# Patient Record
Sex: FEMALE | Race: OTHER | HISPANIC OR LATINO | URBAN - METROPOLITAN AREA
[De-identification: names, ages, dates, MRNs, and addresses within clinical notes are randomized per-mention and may not be internally consistent; named-entity substitution may affect disease eponyms.]

---

## 2018-09-05 ENCOUNTER — EMERGENCY (EMERGENCY)
Facility: HOSPITAL | Age: 38
LOS: 1 days | Discharge: HOME | End: 2018-09-05
Attending: EMERGENCY MEDICINE | Admitting: EMERGENCY MEDICINE
Payer: MEDICAID

## 2018-09-05 VITALS
SYSTOLIC BLOOD PRESSURE: 137 MMHG | DIASTOLIC BLOOD PRESSURE: 83 MMHG | OXYGEN SATURATION: 100 % | RESPIRATION RATE: 16 BRPM | HEART RATE: 81 BPM | TEMPERATURE: 98 F

## 2018-09-05 DIAGNOSIS — Z98.51 TUBAL LIGATION STATUS: Chronic | ICD-10-CM

## 2018-09-05 LAB
ALBUMIN SERPL ELPH-MCNC: 4.1 G/DL — SIGNIFICANT CHANGE UP (ref 3.3–5)
ALP SERPL-CCNC: 93 U/L — SIGNIFICANT CHANGE UP (ref 40–120)
ALT FLD-CCNC: 26 U/L — SIGNIFICANT CHANGE UP (ref 4–33)
APPEARANCE UR: SIGNIFICANT CHANGE UP
AST SERPL-CCNC: 22 U/L — SIGNIFICANT CHANGE UP (ref 4–32)
BASOPHILS # BLD AUTO: 0.02 K/UL — SIGNIFICANT CHANGE UP (ref 0–0.2)
BASOPHILS NFR BLD AUTO: 0.3 % — SIGNIFICANT CHANGE UP (ref 0–2)
BILIRUB SERPL-MCNC: 0.2 MG/DL — SIGNIFICANT CHANGE UP (ref 0.2–1.2)
BILIRUB UR-MCNC: SIGNIFICANT CHANGE UP
BLOOD UR QL VISUAL: SIGNIFICANT CHANGE UP
BUN SERPL-MCNC: 10 MG/DL — SIGNIFICANT CHANGE UP (ref 7–23)
CALCIUM SERPL-MCNC: 9.2 MG/DL — SIGNIFICANT CHANGE UP (ref 8.4–10.5)
CHLORIDE SERPL-SCNC: 101 MMOL/L — SIGNIFICANT CHANGE UP (ref 98–107)
CO2 SERPL-SCNC: 25 MMOL/L — SIGNIFICANT CHANGE UP (ref 22–31)
COLOR SPEC: SIGNIFICANT CHANGE UP
CREAT SERPL-MCNC: 0.6 MG/DL — SIGNIFICANT CHANGE UP (ref 0.5–1.3)
EOSINOPHIL # BLD AUTO: 0.12 K/UL — SIGNIFICANT CHANGE UP (ref 0–0.5)
EOSINOPHIL NFR BLD AUTO: 1.6 % — SIGNIFICANT CHANGE UP (ref 0–6)
GLUCOSE SERPL-MCNC: 106 MG/DL — HIGH (ref 70–99)
GLUCOSE UR-MCNC: 100 — SIGNIFICANT CHANGE UP
HCG SERPL-ACNC: < 5 MIU/ML — SIGNIFICANT CHANGE UP
HCT VFR BLD CALC: 37 % — SIGNIFICANT CHANGE UP (ref 34.5–45)
HGB BLD-MCNC: 12.2 G/DL — SIGNIFICANT CHANGE UP (ref 11.5–15.5)
IMM GRANULOCYTES # BLD AUTO: 0.05 # — SIGNIFICANT CHANGE UP
IMM GRANULOCYTES NFR BLD AUTO: 0.7 % — SIGNIFICANT CHANGE UP (ref 0–1.5)
KETONES UR-MCNC: 15 — SIGNIFICANT CHANGE UP
LEUKOCYTE ESTERASE UR-ACNC: HIGH
LYMPHOCYTES # BLD AUTO: 2.08 K/UL — SIGNIFICANT CHANGE UP (ref 1–3.3)
LYMPHOCYTES # BLD AUTO: 27.1 % — SIGNIFICANT CHANGE UP (ref 13–44)
MCHC RBC-ENTMCNC: 29.7 PG — SIGNIFICANT CHANGE UP (ref 27–34)
MCHC RBC-ENTMCNC: 33 % — SIGNIFICANT CHANGE UP (ref 32–36)
MCV RBC AUTO: 90 FL — SIGNIFICANT CHANGE UP (ref 80–100)
MONOCYTES # BLD AUTO: 0.57 K/UL — SIGNIFICANT CHANGE UP (ref 0–0.9)
MONOCYTES NFR BLD AUTO: 7.4 % — SIGNIFICANT CHANGE UP (ref 2–14)
NEUTROPHILS # BLD AUTO: 4.84 K/UL — SIGNIFICANT CHANGE UP (ref 1.8–7.4)
NEUTROPHILS NFR BLD AUTO: 62.9 % — SIGNIFICANT CHANGE UP (ref 43–77)
NITRITE UR-MCNC: POSITIVE — SIGNIFICANT CHANGE UP
NRBC # FLD: 0 — SIGNIFICANT CHANGE UP
PH UR: 5 — SIGNIFICANT CHANGE UP (ref 5–8)
PLATELET # BLD AUTO: 202 K/UL — SIGNIFICANT CHANGE UP (ref 150–400)
PMV BLD: 12.3 FL — SIGNIFICANT CHANGE UP (ref 7–13)
POTASSIUM SERPL-MCNC: 4 MMOL/L — SIGNIFICANT CHANGE UP (ref 3.5–5.3)
POTASSIUM SERPL-SCNC: 4 MMOL/L — SIGNIFICANT CHANGE UP (ref 3.5–5.3)
PROT SERPL-MCNC: 7.5 G/DL — SIGNIFICANT CHANGE UP (ref 6–8.3)
PROT UR-MCNC: >300 — SIGNIFICANT CHANGE UP
RBC # BLD: 4.11 M/UL — SIGNIFICANT CHANGE UP (ref 3.8–5.2)
RBC # FLD: 13.4 % — SIGNIFICANT CHANGE UP (ref 10.3–14.5)
SODIUM SERPL-SCNC: 138 MMOL/L — SIGNIFICANT CHANGE UP (ref 135–145)
SP GR SPEC: 1.01 — SIGNIFICANT CHANGE UP (ref 1–1.04)
UROBILINOGEN FLD QL: 0.2 — SIGNIFICANT CHANGE UP
WBC # BLD: 7.68 K/UL — SIGNIFICANT CHANGE UP (ref 3.8–10.5)
WBC # FLD AUTO: 7.68 K/UL — SIGNIFICANT CHANGE UP (ref 3.8–10.5)

## 2018-09-05 PROCEDURE — 99283 EMERGENCY DEPT VISIT LOW MDM: CPT

## 2018-09-05 RX ORDER — IBUPROFEN 200 MG
600 TABLET ORAL ONCE
Qty: 0 | Refills: 0 | Status: COMPLETED | OUTPATIENT
Start: 2018-09-05 | End: 2018-09-05

## 2018-09-05 RX ORDER — AZTREONAM 2 G
1 VIAL (EA) INJECTION
Qty: 6 | Refills: 0 | OUTPATIENT
Start: 2018-09-05 | End: 2018-09-07

## 2018-09-05 RX ORDER — IBUPROFEN 200 MG
400 TABLET ORAL ONCE
Qty: 0 | Refills: 0 | Status: DISCONTINUED | OUTPATIENT
Start: 2018-09-05 | End: 2018-09-05

## 2018-09-05 RX ADMIN — Medication 600 MILLIGRAM(S): at 14:33

## 2018-09-05 NOTE — ED PROVIDER NOTE - ATTENDING CONTRIBUTION TO CARE
DR. OBRIEN, ATTENDING MD-  I performed a face to face bedside interview with patient regarding history of present illness, review of symptoms and past medical history. I completed an independent physical exam.  I have discussed patient's plan of care with the resident.   Documentation as above in the note.    Robbin: h/o fibroids, s/p "surgery" for the condition this past spring.  Her fibroid related pains returned a few months ago and occur with each menstruation.  Her current sx are consistent with past pain.  On exam, NAD, comfortable.  Lungs clear, heart rrr, abd soft nt.  A/P fibroid related pain, neg UCG.  No urinary sx.  benign exam.  Ibuprofen given, will f/u GYN

## 2018-09-05 NOTE — ED PROVIDER NOTE - MEDICAL DECISION MAKING DETAILS
39 yo woman PMH fibroids presents with bilateral lower abdominal pain x 4 days with recent onset of abdominal pain. Likely menstrual cramps worsened by fibroids but with flank pain, possible kidney stones or pyelonephritis although less likely to be bilateral. Will check CBC, CMP, UA. Will pain control with ibuprofen. Will do pelvic exam and may need ultrasound. Will continue to monitor and reassess. 39 yo woman PMH fibroids presents with bilateral lower abdominal pain x 4 days with recent onset of abdominal pain. Likely menstrual cramps worsened by fibroids but with flank pain, possible kidney stones or pyelonephritis although less likely to be bilateral. Will check CBC, CMP, UA. Will pain control. Will continue to monitor and reassess. 39 yo woman PMH fibroids presents with bilateral lower abdominal pain x 4 days with recent onset of abdominal pain. Likely menstrual cramps worsened by fibroids but with flank pain, possible kidney stones or pyelonephritis although less likely to be bilateral. Will check CBC, CMP, UA, hcg. Will pain control. Will continue to monitor and reassess.

## 2018-09-05 NOTE — ED PROVIDER NOTE - NS ED ROS FT
General: no fevers or chills  Head: no headaches  Eyes: no vision change  ENT: no ear pain, no neck pain, no dysphagia  CV: no chest pain, no palpitations  Resp: no SOB, no cough  GI: no N/V/D, no blood in stool  : no dysuria, no hematuria  Skin: no rashes General: no fevers or chills  Head: no headaches  Eyes: no vision change  ENT: no ear pain, no neck pain, no dysphagia  CV: no chest pain, no palpitations  Resp: no SOB, no cough  GI: no N/V/D, no blood in stool  : no dysuria, no hematuria, no vaginal discharge  Skin: no rashes

## 2018-09-05 NOTE — ED PROVIDER NOTE - PROGRESS NOTE DETAILS
Marbella Park, resident MD: pt has improvement of pain with ibuprofen. pt denies any dysuria. most likely has menstrual cramping and will discharge with ob/gyn f/u and pain control with ibuprofen. discussed with patient. Marbella Park, resident MD: pt has improvement of pain with ibuprofen. most likely has menstrual cramping and will discharge with ob/gyn f/u and pain control with ibuprofen. Marbella Park, resident MD: pt reports new onset dysuria after using bathroom. UA +nitrites and leuk. Will send abx to treat for UTI. pt understands and will f/u with gyn outpt for menstrual pain. pt is safe for discharge and return precautions were discussed.

## 2018-09-05 NOTE — ED PROVIDER NOTE - PHYSICAL EXAMINATION
General: well-appearing woman in no acute distress  Head: normocephalic, atraumatic  Eyes: PERRL, pink conjunctiva  Mouth: moist mucous membranes  Neck: no lymphadenopathy  CV: normal rate and rhythm, normal S1 and S2  Respiratory: clear to auscultation bilaterally  Abdomen: tender to palpation of LLQ, RLQ, suprapubic region, soft, nondistended  Back: lower back/bilateral flank tenderness to palpation, no midline tenderness to palpation  Neuro: alert and oriented x3  Skin: no rashes or lesions on back or abdomen  Extremities: no edema, peripheral pulses 2+ bilaterally   Pelvic: General: well-appearing woman in no acute distress  Head: normocephalic, atraumatic  Eyes: PERRL, pink conjunctiva  Mouth: moist mucous membranes  Neck: no lymphadenopathy  CV: normal rate and rhythm, normal S1 and S2  Respiratory: clear to auscultation bilaterally  Abdomen: tender to palpation of LLQ, RLQ, suprapubic region, soft, nondistended  Back: lower back/bilateral flank tenderness to palpation, no midline tenderness to palpation  Neuro: alert and oriented x3  Skin: no rashes or lesions on back or abdomen  Extremities: no edema, peripheral pulses 2+ bilaterally

## 2018-09-05 NOTE — ED PROVIDER NOTE - OBJECTIVE STATEMENT
37 yo woman PMH fibroids s/p ?embolization 2/2018 and tubal ligation in 2005 presents with abdominal pain x 4 days. She complains of intermittent, cramping bilateral flank pain that radiates down the groin and to her suprapubic region. She denies dysuria and did not notice blood in the urine prior to her menses. Pt had relief of menstrual pain after embolization for 3 months but in the past 3 months she has had worsening abdominal pain with menses and has been passing small clots. Current pain is similar to pain she had with previous 2 menses and lasts for about 8-9 days. She tried to call the surgeon for follow up but they were closed and decided to get seen in ED because of her continued pain. Her menstrual cycle started yesterday and she took motrin which minimally helped. She denies N/V/D, denies fevers or chills. 37 yo woman PMH fibroids s/p ?embolization 2/2018 and tubal ligation in 2005 presents with abdominal pain x 4 days. She complains of intermittent, cramping bilateral flank pain that radiates down the groin and to her suprapubic region. She denies dysuria and did not notice blood in the urine prior to her menses. Pt had relief of menstrual pain after embolization for 3 months but in the past 3 months she has had worsening abdominal pain with menses and has been passing small clots. Current pain is similar to pain she had with previous 2 menses and lasts for about 8-9 days. She tried to call the surgeon for follow up but they were closed and decided to get seen in ED because of her continued pain. Her menstrual cycle started yesterday and she took motrin which minimally helped. She denies any vaginal discharge. She denies N/V/D, denies fevers or chills.

## 2018-09-05 NOTE — ED ADULT TRIAGE NOTE - CHIEF COMPLAINT QUOTE
Patient c/o lower abdominal pain x 1 week , states she got her period yesterday and has heavy vaginal bleeding. H/o fibroids.

## 2019-08-14 ENCOUNTER — EMERGENCY (EMERGENCY)
Facility: HOSPITAL | Age: 39
LOS: 1 days | Discharge: HOME | End: 2019-08-14
Admitting: EMERGENCY MEDICINE
Payer: MEDICAID

## 2019-08-14 VITALS
RESPIRATION RATE: 15 BRPM | SYSTOLIC BLOOD PRESSURE: 127 MMHG | DIASTOLIC BLOOD PRESSURE: 83 MMHG | HEART RATE: 80 BPM | OXYGEN SATURATION: 100 %

## 2019-08-14 VITALS
DIASTOLIC BLOOD PRESSURE: 89 MMHG | OXYGEN SATURATION: 100 % | RESPIRATION RATE: 16 BRPM | HEART RATE: 83 BPM | TEMPERATURE: 98 F | SYSTOLIC BLOOD PRESSURE: 149 MMHG

## 2019-08-14 DIAGNOSIS — Z98.51 TUBAL LIGATION STATUS: Chronic | ICD-10-CM

## 2019-08-14 DIAGNOSIS — Z90.49 ACQUIRED ABSENCE OF OTHER SPECIFIED PARTS OF DIGESTIVE TRACT: Chronic | ICD-10-CM

## 2019-08-14 DIAGNOSIS — Z90.710 ACQUIRED ABSENCE OF BOTH CERVIX AND UTERUS: Chronic | ICD-10-CM

## 2019-08-14 PROBLEM — D25.9 LEIOMYOMA OF UTERUS, UNSPECIFIED: Chronic | Status: ACTIVE | Noted: 2018-09-05

## 2019-08-14 LAB
ALBUMIN SERPL ELPH-MCNC: 4.5 G/DL — SIGNIFICANT CHANGE UP (ref 3.3–5)
ALP SERPL-CCNC: 95 U/L — SIGNIFICANT CHANGE UP (ref 40–120)
ALT FLD-CCNC: 28 U/L — SIGNIFICANT CHANGE UP (ref 4–33)
ANION GAP SERPL CALC-SCNC: 13 MMO/L — SIGNIFICANT CHANGE UP (ref 7–14)
APPEARANCE UR: CLEAR — SIGNIFICANT CHANGE UP
AST SERPL-CCNC: 18 U/L — SIGNIFICANT CHANGE UP (ref 4–32)
BACTERIA # UR AUTO: NEGATIVE — SIGNIFICANT CHANGE UP
BILIRUB SERPL-MCNC: 0.5 MG/DL — SIGNIFICANT CHANGE UP (ref 0.2–1.2)
BILIRUB UR-MCNC: NEGATIVE — SIGNIFICANT CHANGE UP
BLOOD UR QL VISUAL: HIGH
BUN SERPL-MCNC: 7 MG/DL — SIGNIFICANT CHANGE UP (ref 7–23)
CALCIUM SERPL-MCNC: 9.4 MG/DL — SIGNIFICANT CHANGE UP (ref 8.4–10.5)
CHLORIDE SERPL-SCNC: 103 MMOL/L — SIGNIFICANT CHANGE UP (ref 98–107)
CO2 SERPL-SCNC: 24 MMOL/L — SIGNIFICANT CHANGE UP (ref 22–31)
COLOR SPEC: COLORLESS — SIGNIFICANT CHANGE UP
CREAT SERPL-MCNC: 0.58 MG/DL — SIGNIFICANT CHANGE UP (ref 0.5–1.3)
GLUCOSE SERPL-MCNC: 112 MG/DL — HIGH (ref 70–99)
GLUCOSE UR-MCNC: NEGATIVE — SIGNIFICANT CHANGE UP
HCT VFR BLD CALC: 38.7 % — SIGNIFICANT CHANGE UP (ref 34.5–45)
HGB BLD-MCNC: 13 G/DL — SIGNIFICANT CHANGE UP (ref 11.5–15.5)
HYALINE CASTS # UR AUTO: SIGNIFICANT CHANGE UP
KETONES UR-MCNC: NEGATIVE — SIGNIFICANT CHANGE UP
LEUKOCYTE ESTERASE UR-ACNC: SIGNIFICANT CHANGE UP
MCHC RBC-ENTMCNC: 30.8 PG — SIGNIFICANT CHANGE UP (ref 27–34)
MCHC RBC-ENTMCNC: 33.6 % — SIGNIFICANT CHANGE UP (ref 32–36)
MCV RBC AUTO: 91.7 FL — SIGNIFICANT CHANGE UP (ref 80–100)
NITRITE UR-MCNC: NEGATIVE — SIGNIFICANT CHANGE UP
NRBC # FLD: 0 K/UL — SIGNIFICANT CHANGE UP (ref 0–0)
PH UR: 7 — SIGNIFICANT CHANGE UP (ref 5–8)
PLATELET # BLD AUTO: 216 K/UL — SIGNIFICANT CHANGE UP (ref 150–400)
PMV BLD: 12.1 FL — SIGNIFICANT CHANGE UP (ref 7–13)
POTASSIUM SERPL-MCNC: 3.8 MMOL/L — SIGNIFICANT CHANGE UP (ref 3.5–5.3)
POTASSIUM SERPL-SCNC: 3.8 MMOL/L — SIGNIFICANT CHANGE UP (ref 3.5–5.3)
PROT SERPL-MCNC: 7.7 G/DL — SIGNIFICANT CHANGE UP (ref 6–8.3)
PROT UR-MCNC: NEGATIVE — SIGNIFICANT CHANGE UP
RBC # BLD: 4.22 M/UL — SIGNIFICANT CHANGE UP (ref 3.8–5.2)
RBC # FLD: 12.6 % — SIGNIFICANT CHANGE UP (ref 10.3–14.5)
RBC CASTS # UR COMP ASSIST: SIGNIFICANT CHANGE UP (ref 0–?)
SODIUM SERPL-SCNC: 140 MMOL/L — SIGNIFICANT CHANGE UP (ref 135–145)
SP GR SPEC: 1 — LOW (ref 1–1.04)
SQUAMOUS # UR AUTO: SIGNIFICANT CHANGE UP
UROBILINOGEN FLD QL: NORMAL — SIGNIFICANT CHANGE UP
WBC # BLD: 12.74 K/UL — HIGH (ref 3.8–10.5)
WBC # FLD AUTO: 12.74 K/UL — HIGH (ref 3.8–10.5)
WBC UR QL: HIGH (ref 0–?)

## 2019-08-14 PROCEDURE — 99284 EMERGENCY DEPT VISIT MOD MDM: CPT

## 2019-08-14 RX ORDER — CEFUROXIME AXETIL 250 MG
1 TABLET ORAL
Qty: 20 | Refills: 0
Start: 2019-08-14 | End: 2019-08-23

## 2019-08-14 RX ORDER — SODIUM CHLORIDE 9 MG/ML
1000 INJECTION INTRAMUSCULAR; INTRAVENOUS; SUBCUTANEOUS ONCE
Refills: 0 | Status: COMPLETED | OUTPATIENT
Start: 2019-08-14 | End: 2019-08-14

## 2019-08-14 RX ORDER — PHENAZOPYRIDINE HCL 100 MG
1 TABLET ORAL
Qty: 6 | Refills: 0
Start: 2019-08-14 | End: 2019-08-15

## 2019-08-14 RX ORDER — ACETAMINOPHEN 500 MG
650 TABLET ORAL ONCE
Refills: 0 | Status: COMPLETED | OUTPATIENT
Start: 2019-08-14 | End: 2019-08-14

## 2019-08-14 RX ORDER — KETOROLAC TROMETHAMINE 30 MG/ML
15 SYRINGE (ML) INJECTION ONCE
Refills: 0 | Status: DISCONTINUED | OUTPATIENT
Start: 2019-08-14 | End: 2019-08-14

## 2019-08-14 RX ADMIN — Medication 15 MILLIGRAM(S): at 09:25

## 2019-08-14 RX ADMIN — SODIUM CHLORIDE 2000 MILLILITER(S): 9 INJECTION INTRAMUSCULAR; INTRAVENOUS; SUBCUTANEOUS at 08:47

## 2019-08-14 RX ADMIN — Medication 650 MILLIGRAM(S): at 11:02

## 2019-08-14 RX ADMIN — Medication 15 MILLIGRAM(S): at 08:47

## 2019-08-14 NOTE — ED ADULT NURSE REASSESSMENT NOTE - NS ED NURSE REASSESS COMMENT FT1
Received pt and family to RW  with IVF running via [id number]. Pt c/o back pain and pain upon urination with blood. Awaiting results of labs sent. Explanation re above given to pt.

## 2019-08-14 NOTE — ED ADULT NURSE NOTE - OBJECTIVE STATEMENT
Pt presenting to intake AxOx4 ambulatory at baseline with c/o lower back pain, burning with urination, blood in urine x2 days. Pt denies any significant PMH. On arrival to ED pt's breathing is even and unlabored. Abdomen nondistended. Pt denies CP, SOB, N/V. IV established with 20g in R[facility], labs drawn and sent, MD at bedside, will continue to monitor.

## 2019-08-14 NOTE — ED PROVIDER NOTE - OBJECTIVE STATEMENT
39 y/o F c/o dysuria, urinary frequency, urgency, hematuria, suprapubic pressure, lower back pain since 2 am this morning. Denies fever/chills, nausea, vomiting, flank pain. Pt denies hx of UTI or pyelo.

## 2019-08-14 NOTE — ED PROVIDER NOTE - NSFOLLOWUPINSTRUCTIONS_ED_ALL_ED_FT
Ya mucha agua y liquidos zamzam.  Puede abe ibuprofen (Advil) 3 pastillas (600mg) cada 6 horas con comida.  Sigue con licea doctor primario entre 2-3 lanier.  Regresa al hospital si tiene mas dolor abdominal o dolor de la espalda, vomito, fiebre o si licea condicion o simptomas se ponen peor.

## 2019-08-14 NOTE — ED ADULT TRIAGE NOTE - CHIEF COMPLAINT QUOTE
Patient c/o lower pelvic pain, lower back pain and pain on urination. Patient reports blood in urine starting yesterday. Denies any fevers at home. Hx. hysterectomy.

## 2019-08-14 NOTE — ED PROVIDER NOTE - CLINICAL SUMMARY MEDICAL DECISION MAKING FREE TEXT BOX
Pt with UTI, mildly elevated WBC. She is afebrile, VSS, no CVA tenderness bilat, tolerating PO. Stable for d/c.

## 2019-08-15 LAB — SPECIMEN SOURCE: SIGNIFICANT CHANGE UP

## 2019-08-16 LAB
-  AMIKACIN: SIGNIFICANT CHANGE UP
-  AMPICILLIN/SULBACTAM: SIGNIFICANT CHANGE UP
-  AMPICILLIN: SIGNIFICANT CHANGE UP
-  AZTREONAM: SIGNIFICANT CHANGE UP
-  CEFAZOLIN: SIGNIFICANT CHANGE UP
-  CEFEPIME: SIGNIFICANT CHANGE UP
-  CEFOXITIN: SIGNIFICANT CHANGE UP
-  CEFTAZIDIME: SIGNIFICANT CHANGE UP
-  CEFTRIAXONE: SIGNIFICANT CHANGE UP
-  CIPROFLOXACIN: SIGNIFICANT CHANGE UP
-  ERTAPENEM: SIGNIFICANT CHANGE UP
-  GENTAMICIN: SIGNIFICANT CHANGE UP
-  IMIPENEM: SIGNIFICANT CHANGE UP
-  LEVOFLOXACIN: SIGNIFICANT CHANGE UP
-  MEROPENEM: SIGNIFICANT CHANGE UP
-  NITROFURANTOIN: SIGNIFICANT CHANGE UP
-  PIPERACILLIN/TAZOBACTAM: SIGNIFICANT CHANGE UP
-  TIGECYCLINE: SIGNIFICANT CHANGE UP
-  TOBRAMYCIN: SIGNIFICANT CHANGE UP
-  TRIMETHOPRIM/SULFAMETHOXAZOLE: SIGNIFICANT CHANGE UP
BACTERIA UR CULT: SIGNIFICANT CHANGE UP
METHOD TYPE: SIGNIFICANT CHANGE UP
ORGANISM # SPEC MICROSCOPIC CNT: SIGNIFICANT CHANGE UP
ORGANISM # SPEC MICROSCOPIC CNT: SIGNIFICANT CHANGE UP

## 2019-08-19 ENCOUNTER — EMERGENCY (EMERGENCY)
Facility: HOSPITAL | Age: 39
LOS: 1 days | Discharge: HOME | End: 2019-08-19
Attending: EMERGENCY MEDICINE | Admitting: EMERGENCY MEDICINE
Payer: MEDICAID

## 2019-08-19 VITALS
DIASTOLIC BLOOD PRESSURE: 87 MMHG | SYSTOLIC BLOOD PRESSURE: 144 MMHG | HEART RATE: 75 BPM | OXYGEN SATURATION: 100 % | TEMPERATURE: 98 F | RESPIRATION RATE: 18 BRPM

## 2019-08-19 DIAGNOSIS — Z90.49 ACQUIRED ABSENCE OF OTHER SPECIFIED PARTS OF DIGESTIVE TRACT: Chronic | ICD-10-CM

## 2019-08-19 DIAGNOSIS — Z98.51 TUBAL LIGATION STATUS: Chronic | ICD-10-CM

## 2019-08-19 DIAGNOSIS — Z90.710 ACQUIRED ABSENCE OF BOTH CERVIX AND UTERUS: Chronic | ICD-10-CM

## 2019-08-19 LAB
ANION GAP SERPL CALC-SCNC: 13 MMO/L — SIGNIFICANT CHANGE UP (ref 7–14)
APPEARANCE UR: SIGNIFICANT CHANGE UP
BACTERIA # UR AUTO: SIGNIFICANT CHANGE UP
BASOPHILS # BLD AUTO: 0.04 K/UL — SIGNIFICANT CHANGE UP (ref 0–0.2)
BASOPHILS NFR BLD AUTO: 0.4 % — SIGNIFICANT CHANGE UP (ref 0–2)
BILIRUB UR-MCNC: NEGATIVE — SIGNIFICANT CHANGE UP
BLOOD UR QL VISUAL: SIGNIFICANT CHANGE UP
BUN SERPL-MCNC: 11 MG/DL — SIGNIFICANT CHANGE UP (ref 7–23)
CALCIUM SERPL-MCNC: 9.3 MG/DL — SIGNIFICANT CHANGE UP (ref 8.4–10.5)
CHLORIDE SERPL-SCNC: 103 MMOL/L — SIGNIFICANT CHANGE UP (ref 98–107)
CO2 SERPL-SCNC: 22 MMOL/L — SIGNIFICANT CHANGE UP (ref 22–31)
COLOR SPEC: COLORLESS — SIGNIFICANT CHANGE UP
CREAT SERPL-MCNC: 0.49 MG/DL — LOW (ref 0.5–1.3)
EOSINOPHIL # BLD AUTO: 0.15 K/UL — SIGNIFICANT CHANGE UP (ref 0–0.5)
EOSINOPHIL NFR BLD AUTO: 1.3 % — SIGNIFICANT CHANGE UP (ref 0–6)
EPI CELLS # UR: SIGNIFICANT CHANGE UP
GLUCOSE SERPL-MCNC: 99 MG/DL — SIGNIFICANT CHANGE UP (ref 70–99)
GLUCOSE UR-MCNC: NEGATIVE — SIGNIFICANT CHANGE UP
HCT VFR BLD CALC: 38.3 % — SIGNIFICANT CHANGE UP (ref 34.5–45)
HGB BLD-MCNC: 12.9 G/DL — SIGNIFICANT CHANGE UP (ref 11.5–15.5)
IMM GRANULOCYTES NFR BLD AUTO: 0.4 % — SIGNIFICANT CHANGE UP (ref 0–1.5)
KETONES UR-MCNC: NEGATIVE — SIGNIFICANT CHANGE UP
LEUKOCYTE ESTERASE UR-ACNC: SIGNIFICANT CHANGE UP
LYMPHOCYTES # BLD AUTO: 2.77 K/UL — SIGNIFICANT CHANGE UP (ref 1–3.3)
LYMPHOCYTES # BLD AUTO: 24.3 % — SIGNIFICANT CHANGE UP (ref 13–44)
MCHC RBC-ENTMCNC: 30.7 PG — SIGNIFICANT CHANGE UP (ref 27–34)
MCHC RBC-ENTMCNC: 33.7 % — SIGNIFICANT CHANGE UP (ref 32–36)
MCV RBC AUTO: 91.2 FL — SIGNIFICANT CHANGE UP (ref 80–100)
MONOCYTES # BLD AUTO: 0.52 K/UL — SIGNIFICANT CHANGE UP (ref 0–0.9)
MONOCYTES NFR BLD AUTO: 4.6 % — SIGNIFICANT CHANGE UP (ref 2–14)
NEUTROPHILS # BLD AUTO: 7.85 K/UL — HIGH (ref 1.8–7.4)
NEUTROPHILS NFR BLD AUTO: 69 % — SIGNIFICANT CHANGE UP (ref 43–77)
NITRITE UR-MCNC: NEGATIVE — SIGNIFICANT CHANGE UP
NRBC # FLD: 0 K/UL — SIGNIFICANT CHANGE UP (ref 0–0)
PH UR: 6.5 — SIGNIFICANT CHANGE UP (ref 5–8)
PLATELET # BLD AUTO: 199 K/UL — SIGNIFICANT CHANGE UP (ref 150–400)
PMV BLD: 12.2 FL — SIGNIFICANT CHANGE UP (ref 7–13)
POTASSIUM SERPL-MCNC: 3.8 MMOL/L — SIGNIFICANT CHANGE UP (ref 3.5–5.3)
POTASSIUM SERPL-SCNC: 3.8 MMOL/L — SIGNIFICANT CHANGE UP (ref 3.5–5.3)
PROT UR-MCNC: NEGATIVE — SIGNIFICANT CHANGE UP
RBC # BLD: 4.2 M/UL — SIGNIFICANT CHANGE UP (ref 3.8–5.2)
RBC # FLD: 12.8 % — SIGNIFICANT CHANGE UP (ref 10.3–14.5)
RBC CASTS # UR COMP ASSIST: SIGNIFICANT CHANGE UP (ref 0–?)
SODIUM SERPL-SCNC: 138 MMOL/L — SIGNIFICANT CHANGE UP (ref 135–145)
SP GR SPEC: 1.01 — SIGNIFICANT CHANGE UP (ref 1–1.04)
UROBILINOGEN FLD QL: NORMAL — SIGNIFICANT CHANGE UP
WBC # BLD: 11.38 K/UL — HIGH (ref 3.8–10.5)
WBC # FLD AUTO: 11.38 K/UL — HIGH (ref 3.8–10.5)
WBC UR QL: SIGNIFICANT CHANGE UP (ref 0–?)

## 2019-08-19 PROCEDURE — 99284 EMERGENCY DEPT VISIT MOD MDM: CPT

## 2019-08-19 PROCEDURE — 76830 TRANSVAGINAL US NON-OB: CPT | Mod: 26

## 2019-08-19 RX ORDER — METRONIDAZOLE 500 MG
500 TABLET ORAL ONCE
Refills: 0 | Status: COMPLETED | OUTPATIENT
Start: 2019-08-19 | End: 2019-08-19

## 2019-08-19 RX ORDER — KETOROLAC TROMETHAMINE 30 MG/ML
15 SYRINGE (ML) INJECTION ONCE
Refills: 0 | Status: DISCONTINUED | OUTPATIENT
Start: 2019-08-19 | End: 2019-08-19

## 2019-08-19 RX ORDER — METRONIDAZOLE 500 MG
1 TABLET ORAL
Qty: 21 | Refills: 0
Start: 2019-08-19 | End: 2019-08-25

## 2019-08-19 RX ORDER — FLUCONAZOLE 150 MG/1
1 TABLET ORAL
Qty: 1 | Refills: 0
Start: 2019-08-19

## 2019-08-19 RX ORDER — CIPROFLOXACIN LACTATE 400MG/40ML
1 VIAL (ML) INTRAVENOUS
Qty: 6 | Refills: 0
Start: 2019-08-19 | End: 2019-08-21

## 2019-08-19 RX ORDER — CIPROFLOXACIN LACTATE 400MG/40ML
1 VIAL (ML) INTRAVENOUS
Qty: 14 | Refills: 0
Start: 2019-08-19 | End: 2019-08-25

## 2019-08-19 RX ORDER — CIPROFLOXACIN LACTATE 400MG/40ML
400 VIAL (ML) INTRAVENOUS EVERY 12 HOURS
Refills: 0 | Status: DISCONTINUED | OUTPATIENT
Start: 2019-08-19 | End: 2019-08-31

## 2019-08-19 RX ORDER — FLUCONAZOLE 150 MG/1
150 TABLET ORAL ONCE
Refills: 0 | Status: COMPLETED | OUTPATIENT
Start: 2019-08-19 | End: 2019-08-19

## 2019-08-19 RX ADMIN — Medication 500 MILLIGRAM(S): at 14:55

## 2019-08-19 RX ADMIN — FLUCONAZOLE 150 MILLIGRAM(S): 150 TABLET ORAL at 14:55

## 2019-08-19 RX ADMIN — Medication 200 MILLIGRAM(S): at 16:30

## 2019-08-19 RX ADMIN — Medication 15 MILLIGRAM(S): at 14:55

## 2019-08-19 NOTE — ED ADULT TRIAGE NOTE - CHIEF COMPLAINT QUOTE
Pt was seen in ER Wednesday treated for UTI, pt returns now states symptoms are worse co dysuria.. right flank pain that radiates into right lower groin area.

## 2019-08-19 NOTE — ED PROVIDER NOTE - CLINICAL SUMMARY MEDICAL DECISION MAKING FREE TEXT BOX
40 y/o f with no pertinent PMHx presets to the ED s/p hysterectomy c/o flank pain and dysuria. Plan to start pt on Cipro and Metronidazole and ultrasound to evaluate ovaries.

## 2019-08-19 NOTE — ED ADULT NURSE NOTE - OBJECTIVE STATEMENT
Pt rcvd to room 12 c/o vaginal discomfort and R flank pain. Pt states she was here in ED and dx with UTI. Pt states she was given IV pain meds that relieved the pain slightly but pain has returned. Appears uncomfortable. Medicated as per MD orders. Labs drawn & sent. UCG neg. UA/Ucx sent. 20g IV placed to L ac. Pt taken to US.

## 2019-08-19 NOTE — ED PROVIDER NOTE - OBJECTIVE STATEMENT
40 y/o f with no pertinent PMHx presets to the ED s/p hysterectomy c/o flank pain and dysuria. Pt was diagnosed with pyelonephritis last Wednesday started Cefuroxime, 4 days later she got labial swelling and discharge so she stopped the medicine. Pt states now the dysuria and flank pain have returned. No other acute complaints at time of eval.

## 2019-08-20 LAB — SPECIMEN SOURCE: SIGNIFICANT CHANGE UP

## 2019-11-25 ENCOUNTER — EMERGENCY (EMERGENCY)
Facility: HOSPITAL | Age: 39
LOS: 1 days | Discharge: HOME | End: 2019-11-25
Attending: EMERGENCY MEDICINE | Admitting: EMERGENCY MEDICINE
Payer: COMMERCIAL

## 2019-11-25 VITALS
WEIGHT: 184.97 LBS | HEART RATE: 92 BPM | OXYGEN SATURATION: 99 % | TEMPERATURE: 98 F | SYSTOLIC BLOOD PRESSURE: 146 MMHG | RESPIRATION RATE: 16 BRPM | DIASTOLIC BLOOD PRESSURE: 76 MMHG

## 2019-11-25 DIAGNOSIS — Z98.51 TUBAL LIGATION STATUS: Chronic | ICD-10-CM

## 2019-11-25 DIAGNOSIS — Z90.710 ACQUIRED ABSENCE OF BOTH CERVIX AND UTERUS: Chronic | ICD-10-CM

## 2019-11-25 DIAGNOSIS — Z90.49 ACQUIRED ABSENCE OF OTHER SPECIFIED PARTS OF DIGESTIVE TRACT: Chronic | ICD-10-CM

## 2019-11-25 PROCEDURE — 99284 EMERGENCY DEPT VISIT MOD MDM: CPT | Mod: 25

## 2019-11-25 PROCEDURE — 93010 ELECTROCARDIOGRAM REPORT: CPT

## 2019-11-25 NOTE — ED ADULT TRIAGE NOTE - CHIEF COMPLAINT QUOTE
Ambulatory from home complaining of non-radiating CP x3 days. Last took Advil 6pm yesterday. Denies PMH, SOB, LEVIN, N/V/D. Denies recent air travel or long car rides. EKG in progress.

## 2019-11-26 VITALS
DIASTOLIC BLOOD PRESSURE: 102 MMHG | SYSTOLIC BLOOD PRESSURE: 153 MMHG | RESPIRATION RATE: 16 BRPM | HEART RATE: 80 BPM | OXYGEN SATURATION: 100 %

## 2019-11-26 LAB
ALBUMIN SERPL ELPH-MCNC: 4.4 G/DL — SIGNIFICANT CHANGE UP (ref 3.3–5)
ALP SERPL-CCNC: 89 U/L — SIGNIFICANT CHANGE UP (ref 40–120)
ALT FLD-CCNC: 23 U/L — SIGNIFICANT CHANGE UP (ref 4–33)
ANION GAP SERPL CALC-SCNC: 12 MMO/L — SIGNIFICANT CHANGE UP (ref 7–14)
AST SERPL-CCNC: 16 U/L — SIGNIFICANT CHANGE UP (ref 4–32)
BASOPHILS # BLD AUTO: 0.03 K/UL — SIGNIFICANT CHANGE UP (ref 0–0.2)
BASOPHILS NFR BLD AUTO: 0.3 % — SIGNIFICANT CHANGE UP (ref 0–2)
BILIRUB SERPL-MCNC: 0.3 MG/DL — SIGNIFICANT CHANGE UP (ref 0.2–1.2)
BUN SERPL-MCNC: 12 MG/DL — SIGNIFICANT CHANGE UP (ref 7–23)
CALCIUM SERPL-MCNC: 9.7 MG/DL — SIGNIFICANT CHANGE UP (ref 8.4–10.5)
CHLORIDE SERPL-SCNC: 103 MMOL/L — SIGNIFICANT CHANGE UP (ref 98–107)
CO2 SERPL-SCNC: 25 MMOL/L — SIGNIFICANT CHANGE UP (ref 22–31)
CREAT SERPL-MCNC: 0.75 MG/DL — SIGNIFICANT CHANGE UP (ref 0.5–1.3)
EOSINOPHIL # BLD AUTO: 0.08 K/UL — SIGNIFICANT CHANGE UP (ref 0–0.5)
EOSINOPHIL NFR BLD AUTO: 0.8 % — SIGNIFICANT CHANGE UP (ref 0–6)
GLUCOSE SERPL-MCNC: 114 MG/DL — HIGH (ref 70–99)
HCT VFR BLD CALC: 38.4 % — SIGNIFICANT CHANGE UP (ref 34.5–45)
HGB BLD-MCNC: 12.7 G/DL — SIGNIFICANT CHANGE UP (ref 11.5–15.5)
IMM GRANULOCYTES NFR BLD AUTO: 0.4 % — SIGNIFICANT CHANGE UP (ref 0–1.5)
LYMPHOCYTES # BLD AUTO: 2.66 K/UL — SIGNIFICANT CHANGE UP (ref 1–3.3)
LYMPHOCYTES # BLD AUTO: 26.5 % — SIGNIFICANT CHANGE UP (ref 13–44)
MCHC RBC-ENTMCNC: 30.5 PG — SIGNIFICANT CHANGE UP (ref 27–34)
MCHC RBC-ENTMCNC: 33.1 % — SIGNIFICANT CHANGE UP (ref 32–36)
MCV RBC AUTO: 92.3 FL — SIGNIFICANT CHANGE UP (ref 80–100)
MONOCYTES # BLD AUTO: 0.56 K/UL — SIGNIFICANT CHANGE UP (ref 0–0.9)
MONOCYTES NFR BLD AUTO: 5.6 % — SIGNIFICANT CHANGE UP (ref 2–14)
NEUTROPHILS # BLD AUTO: 6.67 K/UL — SIGNIFICANT CHANGE UP (ref 1.8–7.4)
NEUTROPHILS NFR BLD AUTO: 66.4 % — SIGNIFICANT CHANGE UP (ref 43–77)
NRBC # FLD: 0 K/UL — SIGNIFICANT CHANGE UP (ref 0–0)
PLATELET # BLD AUTO: 224 K/UL — SIGNIFICANT CHANGE UP (ref 150–400)
PMV BLD: 12.8 FL — SIGNIFICANT CHANGE UP (ref 7–13)
POTASSIUM SERPL-MCNC: 3.4 MMOL/L — LOW (ref 3.5–5.3)
POTASSIUM SERPL-SCNC: 3.4 MMOL/L — LOW (ref 3.5–5.3)
PROT SERPL-MCNC: 7.7 G/DL — SIGNIFICANT CHANGE UP (ref 6–8.3)
RBC # BLD: 4.16 M/UL — SIGNIFICANT CHANGE UP (ref 3.8–5.2)
RBC # FLD: 12.5 % — SIGNIFICANT CHANGE UP (ref 10.3–14.5)
SODIUM SERPL-SCNC: 140 MMOL/L — SIGNIFICANT CHANGE UP (ref 135–145)
TROPONIN T, HIGH SENSITIVITY: < 6 NG/L — SIGNIFICANT CHANGE UP (ref ?–14)
WBC # BLD: 10.04 K/UL — SIGNIFICANT CHANGE UP (ref 3.8–10.5)
WBC # FLD AUTO: 10.04 K/UL — SIGNIFICANT CHANGE UP (ref 3.8–10.5)

## 2019-11-26 PROCEDURE — 71046 X-RAY EXAM CHEST 2 VIEWS: CPT | Mod: 26

## 2019-11-26 NOTE — ED PROVIDER NOTE - OBJECTIVE STATEMENT
38 y/o female presents to the ED c/o left sided CP onset two days ago, intermittent and radiating to the left arm. States the pain is not worse with movement or with a deep breath. No SOB. No leg swelling. No hx of blood clots, no recent travel, non smoker, no birth control. No abdominal pain, n/v/d. No family hx of heart disease. Reports hx hysterectomy and cholecystectomy but denies medical hx.

## 2019-11-26 NOTE — ED PROVIDER NOTE - PHYSICAL EXAMINATION
GENERAL: Awake, alert, NAD  HEENT: NC/AT, moist mucous membranes, PERRL, EOMI  LUNGS: CTAB, no wheezes or crackles   CARDIAC: RRR, no m/r/g  ABDOMEN: Soft, normal BS, non tender, non distended, no rebound, no guarding  BACK: No midline spinal tenderness, no CVA tenderness  EXT: No edema, no calf tenderness, 2+ DP pulses bilaterally, no deformities.  NEURO: A&Ox3. Moving all extremities.  SKIN: Warm and dry. No rash.  PSYCH: Normal affect.

## 2019-11-26 NOTE — ED PROVIDER NOTE - CLINICAL SUMMARY MEDICAL DECISION MAKING FREE TEXT BOX
38 y/o female patient presenting to the ED c/o CP. Vitals stable, neurovascularly intact. Heart score 1 given hx. EKG normal/ Will order CXR, CBC, CMP, troponin. Reassess. Likely d/c home if negative troponins.

## 2019-11-26 NOTE — ED PROVIDER NOTE - NS ED ROS FT
CONST: no fevers, no chills  EYES: no pain, no vision changes  ENT: no sore throat, no ear pain, no change in hearing  CV: + chest pain, no leg swelling  RESP: no shortness of breath, no cough  ABD: no abdominal pain, no nausea, no vomiting, no diarrhea  : no dysuria, no flank pain, no hematuria  MSK: no back pain, no extremity pain  NEURO: no headache or additional neurologic complaints  HEME: no easy bleeding  SKIN:  no rash

## 2019-11-26 NOTE — ED PROVIDER NOTE - PROGRESS NOTE DETAILS
Labs and CXR with no acute findings. Patient feels better, wants to be d/c. Will d/c for f/u with PMD.

## 2019-11-26 NOTE — ED PROVIDER NOTE - PATIENT PORTAL LINK FT
You can access the FollowMyHealth Patient Portal offered by [facility] by registering at the following website: [email or web address]. By joining [employer]’s FollowMyHealth portal, you will also be able to view your health information using other applications (apps) compatible with our system.

## 2019-11-26 NOTE — ED PROVIDER NOTE - ATTENDING CONTRIBUTION TO CARE
DR. LIU, ATTENDING MD-  I performed a face to face bedside interview with the patient regarding history of present illness, review of symptoms and past medical history. I completed an independent physical exam.  I have discussed the patient's plan of care with the resident.   Documentation as above in the note.    38 y/o female with left sided cp x3 days.  No card rf's, no early fam h/o cardiac disease, no h/o dvt/pe, no ocp use.  Denies f/c, ha, neck stiffness, sob, cough, abd pain, n/v/d, dysuria, rash.  Afebrile, vs wnl, nad, ctabil, s1s2 rrr no m/r/g, abd soft non ttp no r/g, no cva tenderness b/l, no leg swelling b/l, no rash.  Consider acs though no rf's, doubt pe as low risk by wells and perc's out.  Obtain cbc, bmp, trop, ekg, cxr, reassess, antic dc with cards f/u as o/p.

## 2020-11-27 ENCOUNTER — EMERGENCY (EMERGENCY)
Facility: HOSPITAL | Age: 40
LOS: 1 days | Discharge: HOME | End: 2020-11-27
Attending: EMERGENCY MEDICINE | Admitting: EMERGENCY MEDICINE
Payer: MEDICAID

## 2020-11-27 VITALS
TEMPERATURE: 98 F | DIASTOLIC BLOOD PRESSURE: 86 MMHG | HEART RATE: 92 BPM | SYSTOLIC BLOOD PRESSURE: 139 MMHG | RESPIRATION RATE: 18 BRPM | OXYGEN SATURATION: 98 %

## 2020-11-27 DIAGNOSIS — Z90.49 ACQUIRED ABSENCE OF OTHER SPECIFIED PARTS OF DIGESTIVE TRACT: Chronic | ICD-10-CM

## 2020-11-27 DIAGNOSIS — Z98.51 TUBAL LIGATION STATUS: Chronic | ICD-10-CM

## 2020-11-27 DIAGNOSIS — Z90.710 ACQUIRED ABSENCE OF BOTH CERVIX AND UTERUS: Chronic | ICD-10-CM

## 2020-11-27 LAB — SARS-COV-2 RNA SPEC QL NAA+PROBE: SIGNIFICANT CHANGE UP

## 2020-11-27 PROCEDURE — 99284 EMERGENCY DEPT VISIT MOD MDM: CPT

## 2020-11-27 PROCEDURE — 93010 ELECTROCARDIOGRAM REPORT: CPT

## 2020-11-27 PROCEDURE — 71045 X-RAY EXAM CHEST 1 VIEW: CPT | Mod: 26

## 2020-11-27 RX ORDER — ACETAMINOPHEN 500 MG
975 TABLET ORAL ONCE
Refills: 0 | Status: COMPLETED | OUTPATIENT
Start: 2020-11-27 | End: 2020-11-27

## 2020-11-27 RX ORDER — LIDOCAINE 4 G/100G
1 CREAM TOPICAL ONCE
Refills: 0 | Status: COMPLETED | OUTPATIENT
Start: 2020-11-27 | End: 2020-11-27

## 2020-11-27 RX ADMIN — LIDOCAINE 1 PATCH: 4 CREAM TOPICAL at 16:36

## 2020-11-27 RX ADMIN — Medication 975 MILLIGRAM(S): at 16:36

## 2020-11-27 NOTE — ED PROVIDER NOTE - PROGRESS NOTE DETAILS
José Miguel: pt still uncomfortable however XR clear, covid result pending, will DC with return precautions and instructions to self isolate until results return. Yoseph: Pt endorsed to me from Dr. Harris pending chest xray and pain control.  Patient reports pain is improved to 4-5/10, which is improved from earlier.  COVID results pending, Chest xray clear.  Pt to be discharged with COVID precautions.

## 2020-11-27 NOTE — ED PROVIDER NOTE - PHYSICAL EXAMINATION
Vitals: I have reviewed the patients vital signs. Afebrile saturating well  General: well appearing, well nourished, no acute distress  HEENT: atraumatic, normocephalic, airway patent, EOMI and appropriate tracking  Neck: no JVD, no tracheal deviation, no midline tenderness, no trauma, fROM  Chest/Lungs: no trauma, symmetric chest rise, lung sounds clear bilaterally, speaking in complete sentences  Heart: Regular rate, regular rhythm, skin well perfused, 2+ pulses  Back: no midline tenderness, no CVAT, no step offs  Neuro: A+Ox3, ambulating without difficulty, non dysarthric speech, 5/5 strength  Eyes: EOMI, no conjunctival injection  MSK: all limbs at baseline strength, no wasting or atrophy  Skin: no bleeding, no cyanosis, no jaundice, no new emergent lesions

## 2020-11-27 NOTE — ED PROVIDER NOTE - CLINICAL SUMMARY MEDICAL DECISION MAKING FREE TEXT BOX
41 y/o F no sig PMH presents with back aches x2-3 days. 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 Back ache primarily upper back, some lower back, shoulders. Constant but worse with movement. Similar to pain she has had before but this pain is not fully relieved by tylenol/advil. No back pain red flags, afebrile. Concern for covid w/myalgias 2/2 sick contact. However also considering msk causes will treat pain, covid swab, reassess. Unlikely cardiac or renal. No indication for emergent imaging.

## 2020-11-27 NOTE — ED ADULT TRIAGE NOTE - CHIEF COMPLAINT QUOTE
Body aches x 3 days.  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

## 2020-11-27 NOTE — ED PROVIDER NOTE - NSFOLLOWUPINSTRUCTIONS_ED_ALL_ED_FT
You came to the ER with back pain, we did an Xray of your chest, an EKG, and found no acute abnormality. It is possible that this is from a virus, which may be COVID-19, we have tested you for it and your results are pending. Until you have the results please self isolate as if you are positive. Continue to take tylenol and motrin for pain.     Please return if you develop incontinence, are unable to walk, feel weak on one side of the body, have chest pain, difficulty breathing, or are at all concerned and would like re evaluation.

## 2020-11-27 NOTE — ED ADULT NURSE NOTE - OBJECTIVE STATEMENT
patient alert times four chief complaint body aches 3 xdays. Denies fever chills. patient has (+) Covid exposure taking a class with coworker 1 week prior. denies SOB. skin warm dry appropriate color. body aches worsening at night, primarily mid/upper back. ambulates steady gait denies falls trauma or injury. Safety education reinforced with call bell within reach. Verbalizes understanding of use. Will continue to monitor.

## 2020-11-27 NOTE — ED PROVIDER NOTE - ATTENDING CONTRIBUTION TO CARE
I have personally performed a face to face bedside history and physical examination of this patient. I have discussed the history, examination, review of systems, assessment and plan of management with the resident. I have reviewed the electronic medical record and amended it to reflect my history, review of systems, physical exam, assessment and plan.    Brief HPI:  41 y/o F no sig PMH presents with back aches x2-3 days.  Reports covid exposure.  No numbness, tingling, weakness, bowel or bladder changes, trauma, difficulty ambulating.  No cp, sob, fever, chills.     Vitals:   Reviewed    Exam:    GEN:  Non-toxic appearing, non-distressed, speaking full sentences, non-diaphoretic, AAOx3  HEENT:  NCAT, neck supple, EOMI, PERRLA, sclera anicteric, no conjunctival pallor or injection, no stridor, normal voice, no tonsillar exudate, uvula midline, tympanic membranes and external auditory canals normal appearing bilaterally   CV:  regular rhythm and rate, s1/s2 audible, no murmurs, rubs or gallops, peripheral pulses 2+ and symmetric  PULM:  non-labored respirations, lungs clear to auscultation bilaterally, no wheezes, crackles or rales  ABD:  non distended, non-tender, no rebound, no guarding, negative Mancia's sign, bowel sounds normal, no cvat  MSK:  no gross deformity, non-tender extremities and joints, range of motion grossly normal appearing, no extremity edema, extremities warm and well perfused   NEURO:  AAOx3, CN II-XII intact, motor 5/5 in upper and lower extremities bilaterally, sensation grossly intact in extremities and trunk, finger to nose testing wnl, no nystagmus, negative Romberg, no pronator drift, no gait deficit  SKIN:  warm, dry, no rash or vesicles   BACK:  No midline c/t/l spine tenderness.  Mild bilateral paraspinal tenderness in thoracic back.    A/P:  41 y/o F no sig PMH presents with back aches x2-3 days.  VSS AF.  Exam without focal neuro findings or midline tenderness.  Low concern for spinal epidural abscess, transverse myelitis, acute cord compression, or cauda equina syndrome at this time.  The patient possesses no red flags including fever, chills, history of malignancy, history of intravenous drug use, recent spinal instrumentation, predominant nocturnal pain, history of immunosuppression, pelvic or perineal anesthesia or paresthesia, or fecal or urinary incontinence or retention.  Will send cxr, ekg, covid-19 pcr, supportive care.  Dispo pending.

## 2020-11-27 NOTE — ED PROVIDER NOTE - NS ED ROS FT
Constitutional: (-) fever (-) vomiting  Eyes/ENT: (-) vision changes  Cardiovascular: (-) chest pain  Respiratory: (-) cough, (-) shortness of breath  Gastrointestinal: (-) vomiting, (-) diarrhea, (-) abdominal pain  : (-) dysuria   Musculoskeletal: (+) back pain  Integumentary: (-) rash, (-) edema  Neurological: (-)loc  Allergic/Immunologic: (-) pruritus  Endocrine: No history of thyroid disease

## 2020-11-27 NOTE — ED PROVIDER NOTE - PATIENT PORTAL LINK FT
You can access the FollowMyHealth Patient Portal offered by [facility] by registering at the following website: [email or web address]. By joining [employer]’s FollowMyHealth portal, you will also be able to view your health information using other applications (apps) compatible with our system.

## 2020-11-27 NOTE — ED PROVIDER NOTE - OBJECTIVE STATEMENT
39 y/o F no sig PMH presents with back aches x2-3 days. 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 States her upper back has been hurting, aches, some soreness in shoulders, initially admitted to some leg pain but then states her legs were not bothering her. Worse with movement, still present at rest, no significant trauma. Denies bowel/bladder incontinence, difficulty walking, saddle anesthesia. Has been taking tylenol and aleve for the pain (last aleve 10am, tylenol yesterday) with minimal relief. No f/c, cough, sob, cp, n/v, dysuria/hematuria.

## 2020-11-28 NOTE — ED POST DISCHARGE NOTE - RESULT SUMMARY
Spoke to pt with  [id number], pt requests COVID-19 results, advised that result is negative. Pt states she does  not need anything further.

## 2021-07-30 NOTE — ED ADULT TRIAGE NOTE - STATUS:
General


Chief Complaint:  Cough/Cold/Flu Symptoms


Stated Complaint:  SOB,COUGH,SORE THROAT,HA


Nursing Triage Note:  


Pt c/o cough and migraine since Monday.  SOB started Tuesday.  Pt reports 


symptoms are worsening and daughter was swabbed for COVID yesterday but nebver 


received results.


Source of Information:  Patient


Exam Limitations:  No Limitations





History of Present Illness


Date Seen by Provider:  Jul 30, 2021


Time Seen by Provider:  07:05





Allergies and Home Medications


Allergies


Coded Allergies:  


     No Known Drug Allergies (Unverified , 7/15/20)





Home Medications


No Active Prescriptions or Reported Meds





Past Medical-Social-Family Hx


Patient Social History


Tobacco Use?:  Yes


Tobacco type used:  Cigarettes


Smoking Status:  Current Everyday Smoker


Use of E-Cig and/or Vaping dev:  No


Substance use?:  Yes


Substance type:  Marijuana


Alcohol Use?:  Yes


Alcohol Frequency:  Once in a while


Pt feels they are or have been:  No





Immunizations Up To Date


Tetanus Booster (TDap):  Unknown





Seasonal Allergies


Seasonal Allergies:  Yes





Past Medical History


Surgeries:  Yes (LIPMA REMOVAL KNEE)


Orthopedic


Respiratory:  Yes


Asthma


Currently Using CPAP:  No


Currently Using BIPAP:  No


Cardiac:  Yes


Hypertension


Neurological:  No (SEIZURES AS CHILD-NONE SINCE AGE 16)


Seizure Disorder


Reproductive Disorders:  No


Sexually Transmitted Disease:  No


HIV/AIDS:  No


Genitourinary:  No


Gastrointestinal:  No


Musculoskeletal:  Yes


Chronic Back Pain


Endocrine:  No


HEENT:  Yes (GLASSES)


Loss of Vision:  Denies


Hearing Impairment:  Denies


Cancer:  No


Psychosocial:  No


Integumentary:  Yes


Eczema


Blood Disorders:  No


Adverse Reaction/Blood Tranf:  No (N/A)





Family Medical History


No Pertinent Family Hx





Noncontributory





Physical Exam


Vital Signs





Vital Signs - First Documented




















Capillary Refill : Less Than 3 Seconds


Height, Weight, BMI


Height: 5'7.00"


Weight: 200lbs. oz. [id number]; 34.00 BMI


Method:Estimated





Progress/Results/Core Measures


Suspected Sepsis


SIRS


Temperature: 


Pulse: 96 


Respiratory Rate: 18


 


Blood Pressure 145 /108 


Mean: 120





Results/Orders


Lab Results





Laboratory Tests








Test


 7/30/21


07:26 Range/Units


 


 


Influenza Type A (RT-PCR) Not Detected  Not Detecte  


 


Influenza Type B (RT-PCR) Not Detected  Not Detecte  


 


SARS-CoV-2 RNA (RT-PCR) Detected H Not Detecte  








My Orders





Orders - KENNETH SKINNER MD


Covid 19 Inhouse Test (7/30/21 07:05)


Influenza A And B By Pcr (7/30/21 07:05)





Vital Signs/I&O











 7/30/21 7/30/21





 07:19 07:19


 


Temp 35.9 


 


Pulse 96 


 


Resp 18 


 


B/P (MAP) 145/108 (120) 


 


Pulse Ox 96 


 


O2 Delivery Room Air Room Air





Capillary Refill : Less Than 3 Seconds








Blood Pressure Mean:                    120











Departure


Impression





   Primary Impression:  


   COVID-19


   Additional Impression:  


   Asthma


   Qualified Codes:  J45.909 - Unspecified asthma, uncomplicated


Disposition:  01 HOME, SELF-CARE


Condition:  Stable





Departure-Patient Inst.


Decision time for Depature:  08:52


Referrals:  


NO,LOCAL PHYSICIAN (PCP/Family)


Primary Care Physician


Patient Instructions:  COVID-19 Overview, REGEN-COV (casirivimab and imdevimab) 

FDA Fact Sheet





Add. Discharge Instructions:  


Remain in quarantine for 10 days from the day of onset of symptoms and until 

your symptoms are resolved.  Close contacts need to remain in quarantine for 10 

days from their last contact with you.


Continue using your inhaler as directed.  Use dexamethasone to help with your 

asthma and to prevent more complications from COVID-19.


Drink plenty of clear liquids to stay well-hydrated.


Eat a healthy well-balanced diet and supplement with a multivitamin.


An order for monoclonal antibody therapy has been submitted to the pharmacy.  

They should call you soon to schedule an appointment if you qualify.  If you do 

not hear back from the pharmacy within 48 hours, feel free to call for status 

update.  Please review the fact sheet provided.


If possible, obtain a pulse oximeter and check your oxygen saturations 

frequently, especially if you are significantly short of breath.  If your oxygen

saturation is persistently below 92% or if it ever drops below 90%, please 

return to the emergency room.


Avoid prolonged periods of time in bed or in one position.  Frequent position 

changes and ambulation often improve breathing with COVID-19.


Call with questions or concerns.


Return to the ER if you have any worsening of symptoms.





All discharge instructions reviewed with patient and/or family. Voiced 

understanding.


Scripts


Dexamethasone (Dexamethasone) 6 Mg Tablet


6 MG PO DAILY, #10 TAB


   Prov: KENNETH SKINNER MD         7/30/21


Work/School Note:  Work Release Form   Date Seen in the Emergency Department:  

Jul 30, 2021


   Return to Work:  Aug 5, 2021


      Other Restrictions Listed Below:  May return after 10 days of quarantine 

if no symptoms.











KENNETH SKINNER MD        Jul 30, 2021 08:58 [employer]

## 2023-08-03 ENCOUNTER — EMERGENCY (EMERGENCY)
Facility: HOSPITAL | Age: 43
LOS: 1 days | Discharge: HOME | End: 2023-08-03
Admitting: EMERGENCY MEDICINE
Payer: COMMERCIAL

## 2023-08-03 VITALS
TEMPERATURE: 98 F | HEART RATE: 85 BPM | SYSTOLIC BLOOD PRESSURE: 140 MMHG | OXYGEN SATURATION: 98 % | DIASTOLIC BLOOD PRESSURE: 65 MMHG | RESPIRATION RATE: 18 BRPM

## 2023-08-03 DIAGNOSIS — Z90.710 ACQUIRED ABSENCE OF BOTH CERVIX AND UTERUS: Chronic | ICD-10-CM

## 2023-08-03 DIAGNOSIS — Z90.49 ACQUIRED ABSENCE OF OTHER SPECIFIED PARTS OF DIGESTIVE TRACT: Chronic | ICD-10-CM

## 2023-08-03 DIAGNOSIS — Z98.51 TUBAL LIGATION STATUS: Chronic | ICD-10-CM

## 2023-08-03 PROCEDURE — 99284 EMERGENCY DEPT VISIT MOD MDM: CPT

## 2023-08-03 RX ORDER — METOCLOPRAMIDE HCL 10 MG
10 TABLET ORAL ONCE
Refills: 0 | Status: COMPLETED | OUTPATIENT
Start: 2023-08-03 | End: 2023-08-03

## 2023-08-03 RX ORDER — SODIUM CHLORIDE 9 MG/ML
1000 INJECTION INTRAMUSCULAR; INTRAVENOUS; SUBCUTANEOUS ONCE
Refills: 0 | Status: COMPLETED | OUTPATIENT
Start: 2023-08-03 | End: 2023-08-03

## 2023-08-03 RX ORDER — ACETAMINOPHEN 500 MG
650 TABLET ORAL ONCE
Refills: 0 | Status: COMPLETED | OUTPATIENT
Start: 2023-08-03 | End: 2023-08-03

## 2023-08-03 RX ADMIN — Medication 10 MILLIGRAM(S): at 10:45

## 2023-08-03 RX ADMIN — SODIUM CHLORIDE 1000 MILLILITER(S): 9 INJECTION INTRAMUSCULAR; INTRAVENOUS; SUBCUTANEOUS at 10:45

## 2023-08-03 RX ADMIN — Medication 650 MILLIGRAM(S): at 10:45

## 2023-08-03 NOTE — ED PROVIDER NOTE - CLINICAL SUMMARY MEDICAL DECISION MAKING FREE TEXT BOX
41 y/o female pmh migraines c/o headache x3 days. headache is L sided, gradual onset, throbbing, associated with nausea and dizziness. Pt states that this is very similar to previous migraines. Pt is well appearing, NAD, afebrile, no neuro deficits, steady gait, no red flags. symptoms likely 2/2 migraine headache, low concern for acute intracranial pathology. Will treat with reglan, tylenol and IV fluids, will reassess.

## 2023-08-03 NOTE — ED PROVIDER NOTE - PROGRESS NOTE DETAILS
JERSON Hill- Pt feeling better after ER stay, isis improved. Pt asking for dc. pt has neuro she can f/u with .stable for dc.

## 2023-08-03 NOTE — ED PROVIDER NOTE - PATIENT PORTAL LINK FT
You can access the FollowMyHealth Patient Portal offered by [facility] by registering at the following website: [email or web address]. By joining [facility]’s FollowMyHealth portal, you will also be able to view your health information using other applications (apps) compatible with our system.

## 2023-08-03 NOTE — ED PROVIDER NOTE - NSICDXPASTSURGICALHX_GEN_ALL_CORE_FT
PAST SURGICAL HISTORY:  H/O abdominal hysterectomy     History of bilateral tubal ligation 2005    S/P cholecystectomy

## 2023-08-03 NOTE — ED ADULT NURSE NOTE - OBJECTIVE STATEMENT
Pt received into intake. Pt is AxO3 and ambulatory. Pt complaining of diffuse headache x 4days. Pt is able to follow commands. -BEFAST. Respirations are even and unlabored. Pt denies chest pain, SOB, N/V/D, abdominal pain, fever like symptoms.  Awaiting provider orders. Will continue to monitor, and maintain safety. Pt received into intake. Pt is AxO3 and ambulatory. Pt complaining of diffuse headache x 4days. Pt is able to follow commands. -BEFAST. Respirations are even and unlabored. pt denies recent falls or injury.  Pt denies chest pain, SOB, N/V/D, abdominal pain, fever like symptoms.  Awaiting provider orders. Will continue to monitor, and maintain safety.

## 2023-08-03 NOTE — ED PROVIDER NOTE - CHIEF COMPLAINT
Lipids: ordered.   Fasting Glucose: ordered.    PHQ2: done 11/16/17 and normal.     Flu vaccine: given 11/16/17.   Tdap: recommended. Patient will check with insurance to ensure coverage.    Patient does try to eat well. He does not exercise much. He does have occasional sleep maintenance and sleep onset insomnia. He has been using melatonin recently and is tolerating the medicine well.  
The patient is a 42y Female complaining of headache.

## 2023-08-03 NOTE — ED PROVIDER NOTE - OBJECTIVE STATEMENT
41 y/o female pmh migraines c/o headache x 3 days. Pt admits to L sided headache, constant, gradual onset, associated with photophobia, nausea and dizziness. Pt states that this feels very similar to previous migraines. Pt has been taking tylenol with no relief. Pt went to her PMD x 2 days ago and was prescribed sumatriptan which helps for about 1 hour and then headache returns. Pt denies chest pain, sob, abd pain, v/d, numbness, tingling, weakness, neck pain, change in vision, syncope, fever or chills.

## 2023-08-03 NOTE — ED PROVIDER NOTE - NSFOLLOWUPINSTRUCTIONS_ED_ALL_ED_FT
Migraine Headache    WHAT YOU NEED TO KNOW:    What is a migraine headache? A migraine is a severe headache. The pain can be so severe that it interferes with your daily activities. A migraine can last a few hours up to several days. The exact cause of migraines is not known. A family history of migraines increases your risk. Your risk is also higher if you are a woman or take medicines such as estrogen or a vasodilator.    What are the warning signs that a migraine headache is about to start? Warning signs usually start 15 to 60 minutes before the headache:    Visual changes (auras), such as blurred vision, temporary blind or bright spots, lines, or hallucinations    Unusual tiredness or frequent yawning    Tingling in an arm or leg  What are the signs and symptoms of a migraine headache? A migraine headache usually begins as a dull ache around the eye or temple. The pain may get worse with movement. You may also have the following:    Pain in your head that may increase to the point that you cannot do everyday activities    Pain on one or both sides of your head    Throbbing, pulsing, or pounding pain in your head    Nausea and vomiting    Sensitivity to light, noise, or smells  What can trigger a migraine headache?    Stress, eye strain, oversleeping, or not getting enough sleep    Hormone changes in women from birth control pills, pregnancy, menopause, or during a monthly period    Skipping meals, going too long without eating, or not drinking enough liquids    Certain foods or drinks such as chocolate, hard cheese, alcohol, or drinks that contain caffeine    Foods that contain gluten, nitrates, MSG, or artificial sweeteners    Sunlight, bright or flashing lights, loud noises, smoke, or strong smells    Heat, humidity, or changes in the weather  How is a migraine headache diagnosed? Your healthcare provider will ask about your headaches. Describe the pain and any other symptoms, such as nausea. Tell the provider if you think anything triggered the pain. The provider will also want to know what you ate and drank before the pain started. Tell the provider about any medical conditions you have or that run in your family. Include any recent stressors you have had. You may also need any of the following:    A neurologic exam is used to check how your pupils react to light. Your healthcare provider may check your memory, hand grasp, and balance.    CT or MRI pictures may be taken of your brain. You may be given contrast liquid to help your brain show up better in the pictures. Tell the healthcare provider if you have ever had an allergic reaction to contrast liquid. Do not enter the MRI room with anything metal. Metal can cause serious injury. Tell the healthcare provider if you have any metal in or on your body.  How is a migraine headache treated? Migraines cannot be cured. The goal of treatment is to reduce your symptoms.    Medicines may be given to help you manage migraines. Take medicine as soon as you feel a migraine begin, or as directed. Your healthcare provider may recommend any of the following:  Migraine medicines are used to help prevent or stop a migraine.    NSAIDs help decrease swelling and pain or fever. This medicine is available with or without a doctor's order. NSAIDs can cause stomach bleeding or kidney problems in certain people. If you take blood thinner medicine, always ask your healthcare provider if NSAIDs are safe for you. Always read the medicine label and follow directions.    Acetaminophen decreases pain and fever. It is available without a doctor's order. Ask how much to take and how often to take it. Follow directions. Read the labels of all other medicines you are using to see if they also contain acetaminophen, or ask your doctor or pharmacist. Acetaminophen can cause liver damage if not taken correctly.    Prescription pain medicine may be given. Ask your healthcare provider how to take this medicine safely. Some prescription pain medicines contain acetaminophen. Do not take other medicines that contain acetaminophen without talking to your healthcare provider. Too much acetaminophen may cause liver damage. Prescription pain medicine may cause constipation. Ask your healthcare provider how to prevent or treat constipation.    Antinausea medicine may be given to calm your stomach and to help prevent vomiting. This medicine can also help relieve pain.    Cognitive behavior therapy (CBT) can help you learn ways to manage and prevent migraines. A therapist can teach you to relax and to reduce stress. You may learn ways to create healthy nutrition, activity, and sleep habits to prevent migraines. The therapist can also help you manage conditions that can affect migraines, such as anxiety or depression.  What can I do to manage my symptoms?    Rest in a dark, quiet room. This will help decrease your pain. Sleep may also help relieve the pain.    Apply ice to decrease pain. Use an ice pack, or put crushed ice in a plastic bag. Cover the ice pack with a towel and place it on your head. Apply ice for 15 to 20 minutes every hour.    Apply heat to decrease pain and muscle spasms. Use a small towel dampened with warm water or a heating pad, or sit in a warm bath. Apply heat on the area for 20 to 30 minutes every 2 hours. You may alternate heat and ice.    Keep a migraine record. Write down when your migraines start and stop. Include your symptoms and what you were doing when a migraine began. Record what you ate or drank for 24 hours before the migraine started. Keep track of what you did to treat your migraine and if it worked. Bring the migraine record with you to visits with your healthcare provider.  What can I do to prevent another migraine headache?    Prevent a medicine overuse headache. Take pain medicines only as long as directed. A medicine may be limited to a certain amount each month. Your healthcare provider can help you create a plan so you get a safe amount each month.    Do not smoke. Nicotine and other chemicals in cigarettes and cigars can trigger a migraine or make it worse. Ask your healthcare provider for information if you currently smoke and need help to quit. E-cigarettes or smokeless tobacco still contain nicotine. Talk to your healthcare provider before you use these products.    Do not drink alcohol. Alcohol can trigger a migraine. It can also keep medicines used to treat your migraines from working.    Be physically active. Physical activity, such as exercise, may help prevent migraines. Talk to your healthcare provider about the best activity plan for you. Try to get at least 30 minutes of physical activity on most days.   FAMILY WALKING FOR EXERCISE      Manage stress. Stress may trigger a migraine. Learn new ways to relax, such as deep breathing.    Create a sleep schedule. Go to bed and get up at the same times each day. Do not watch television before bed.    Eat a variety of healthy foods. Include healthy foods such as include fruit, vegetables, whole-grain breads, low-fat dairy products, beans, lean meat, and fish. Do not have food or drinks that trigger your migraines.  Healthy Foods      Drink more liquids to prevent dehydration. Your healthcare provider can tell you how much liquid to drink each day and which liquids are best for you.  Call your local emergency number (911 in the [place]) or have someone call if:    You feel like you are going to faint, you become confused, or you have a seizure.    When should I seek immediate care?    You have a headache that seems different or much worse than your usual migraine headache.    You have a severe headache with a fever or a stiff neck.    You have new problems with speech, vision, balance, or movement.  When should I call my doctor?    Your migraines interfere with your daily activities.    Your medicines or treatments stop working.    You have questions or concerns about your condition or care.  CARE AGREEMENT:    You have the right to help plan your care. Learn about your health condition and how it may be treated. Discuss treatment options with your healthcare providers to decide what care you want to receive. You always have the right to refuse treatment.